# Patient Record
Sex: FEMALE | Race: WHITE | NOT HISPANIC OR LATINO | Employment: OTHER | URBAN - METROPOLITAN AREA
[De-identification: names, ages, dates, MRNs, and addresses within clinical notes are randomized per-mention and may not be internally consistent; named-entity substitution may affect disease eponyms.]

---

## 2017-12-21 ENCOUNTER — ESTABLISHED PATIENT (OUTPATIENT)
Dept: URBAN - METROPOLITAN AREA CLINIC 39 | Facility: CLINIC | Age: 72
End: 2017-12-21

## 2017-12-21 VITALS
HEART RATE: 59 BPM | RESPIRATION RATE: 14 BRPM | SYSTOLIC BLOOD PRESSURE: 150 MMHG | DIASTOLIC BLOOD PRESSURE: 75 MMHG | HEIGHT: 55 IN

## 2017-12-21 DIAGNOSIS — H35.413: ICD-10-CM

## 2017-12-21 DIAGNOSIS — H04.123: ICD-10-CM

## 2017-12-21 DIAGNOSIS — Z98.890: ICD-10-CM

## 2017-12-21 PROCEDURE — 92015 DETERMINE REFRACTIVE STATE: CPT

## 2017-12-21 PROCEDURE — 92134 CPTRZ OPH DX IMG PST SGM RTA: CPT

## 2017-12-21 PROCEDURE — 92014 COMPRE OPH EXAM EST PT 1/>: CPT

## 2017-12-21 ASSESSMENT — VISUAL ACUITY
OD_SC: J2
OS_SC: J2
OD_SC: 20/30
OS_SC: 20/40

## 2017-12-21 ASSESSMENT — TONOMETRY
OS_IOP_MMHG: 20
OD_IOP_MMHG: 20

## 2018-12-20 ENCOUNTER — ESTABLISHED COMPREHENSIVE EXAM (OUTPATIENT)
Dept: URBAN - METROPOLITAN AREA CLINIC 39 | Facility: CLINIC | Age: 73
End: 2018-12-20

## 2018-12-20 DIAGNOSIS — H04.123: ICD-10-CM

## 2018-12-20 DIAGNOSIS — H35.413: ICD-10-CM

## 2018-12-20 DIAGNOSIS — H40.059: ICD-10-CM

## 2018-12-20 PROCEDURE — 92015 DETERMINE REFRACTIVE STATE: CPT

## 2018-12-20 PROCEDURE — 92014 COMPRE OPH EXAM EST PT 1/>: CPT

## 2018-12-20 PROCEDURE — 92134 CPTRZ OPH DX IMG PST SGM RTA: CPT

## 2018-12-20 PROCEDURE — 92133 CPTRZD OPH DX IMG PST SGM ON: CPT

## 2018-12-20 ASSESSMENT — VISUAL ACUITY
OD_SC: 20/40-1
OU_SC: J1
OS_SC: J2
OU_SC: 20/20-1
OS_SC: 20/25-1+2
OD_SC: J1

## 2018-12-20 ASSESSMENT — TONOMETRY
OD_IOP_MMHG: 23
OD_IOP_MMHG: 26
OS_IOP_MMHG: 21
OS_IOP_MMHG: 19

## 2019-12-20 ENCOUNTER — ESTABLISHED COMPREHENSIVE EXAM (OUTPATIENT)
Dept: URBAN - METROPOLITAN AREA CLINIC 40 | Facility: CLINIC | Age: 74
End: 2019-12-20

## 2019-12-20 DIAGNOSIS — H40.053: ICD-10-CM

## 2019-12-20 DIAGNOSIS — H04.123: ICD-10-CM

## 2019-12-20 DIAGNOSIS — H35.413: ICD-10-CM

## 2019-12-20 DIAGNOSIS — Z96.1: ICD-10-CM

## 2019-12-20 PROCEDURE — 92015 DETERMINE REFRACTIVE STATE: CPT

## 2019-12-20 PROCEDURE — 92499OP2 OPTOMAP RETINAL SCREENING BOTH EYES

## 2019-12-20 PROCEDURE — 92014 COMPRE OPH EXAM EST PT 1/>: CPT

## 2019-12-20 ASSESSMENT — VISUAL ACUITY
OD_SC: J6
OS_CC: 20/20
OU_SC: J3 @ 16"
OU_SC: 20/20
OD_SC: 20/20
OS_SC: 20/25
OS_SC: J5
OU_CC: 20/20
OD_CC: 20/20

## 2019-12-20 ASSESSMENT — TONOMETRY
OD_IOP_MMHG: 22
OS_IOP_MMHG: 22

## 2019-12-20 ASSESSMENT — KERATOMETRY
OS_AXISANGLE2_DEGREES: 74
OS_K2POWER_DIOPTERS: 45.5
OS_K1POWER_DIOPTERS: 45.25
OD_K2POWER_DIOPTERS: 45.25
OD_AXISANGLE2_DEGREES: 93
OD_K1POWER_DIOPTERS: 46
OS_AXISANGLE_DEGREES: 164
OD_AXISANGLE_DEGREES: 3

## 2020-03-19 NOTE — PATIENT DISCUSSION
Patient advised of the right to post-operative care by the surgeon. Patient is fully informed of, and agreed to, co-management with their primary optometric physician. Post-operative care by the surgeon is not medically necessary and co-management is clinically appropriate. Patient has received itemization of fees related to cataract surgery. Transfer of care letter completed for the patient. Transfer care of left eye to Dr. Kendall Munoz on 3.20.20. Patient instructed to call immediately if any new distortion, blurring, decreased vision or eye pain.

## 2020-04-28 ENCOUNTER — EST. PATIENT EMERGENCY (OUTPATIENT)
Dept: URBAN - METROPOLITAN AREA CLINIC 39 | Facility: CLINIC | Age: 75
End: 2020-04-28

## 2020-04-28 DIAGNOSIS — H35.413: ICD-10-CM

## 2020-04-28 DIAGNOSIS — H40.053: ICD-10-CM

## 2020-04-28 DIAGNOSIS — H35.352: ICD-10-CM

## 2020-04-28 DIAGNOSIS — H43.813: ICD-10-CM

## 2020-04-28 DIAGNOSIS — H35.372: ICD-10-CM

## 2020-04-28 DIAGNOSIS — H04.123: ICD-10-CM

## 2020-04-28 PROCEDURE — 92015 DETERMINE REFRACTIVE STATE: CPT

## 2020-04-28 PROCEDURE — 92134 CPTRZ OPH DX IMG PST SGM RTA: CPT

## 2020-04-28 PROCEDURE — 92014 COMPRE OPH EXAM EST PT 1/>: CPT

## 2020-04-28 PROCEDURE — 76514 ECHO EXAM OF EYE THICKNESS: CPT

## 2020-04-28 ASSESSMENT — KERATOMETRY
OS_K2POWER_DIOPTERS: 45.5
OD_K2POWER_DIOPTERS: 45.25
OS_K1POWER_DIOPTERS: 45.25
OD_K1POWER_DIOPTERS: 46
OD_AXISANGLE2_DEGREES: 93
OD_AXISANGLE_DEGREES: 3
OS_AXISANGLE2_DEGREES: 74
OS_AXISANGLE_DEGREES: 164

## 2020-04-28 ASSESSMENT — TONOMETRY
OS_IOP_MMHG: 18
OS_IOP_MMHG: 24
OD_IOP_MMHG: 26

## 2020-04-28 ASSESSMENT — VISUAL ACUITY
OD_SC: J2
OD_SC: 20/30-
OS_SC: 20/50-1
OS_SC: J5-

## 2020-05-07 NOTE — PATIENT DISCUSSION
Cataract surgery has been performed in the first eye and activities of daily living are still impaired. The patient would like to proceed with cataract surgery in the second eye as scheduled. The patient elects ADV TMF  OD, goal of HERB.

## 2020-05-11 ENCOUNTER — RETINA CONSULT (OUTPATIENT)
Dept: URBAN - METROPOLITAN AREA CLINIC 39 | Facility: CLINIC | Age: 75
End: 2020-05-11

## 2020-05-11 DIAGNOSIS — H35.372: ICD-10-CM

## 2020-05-11 DIAGNOSIS — H35.363: ICD-10-CM

## 2020-05-11 DIAGNOSIS — H35.352: ICD-10-CM

## 2020-05-11 DIAGNOSIS — H43.813: ICD-10-CM

## 2020-05-11 DIAGNOSIS — H35.413: ICD-10-CM

## 2020-05-11 PROCEDURE — 92014 COMPRE OPH EXAM EST PT 1/>: CPT

## 2020-05-11 PROCEDURE — 92250 FUNDUS PHOTOGRAPHY W/I&R: CPT

## 2020-05-11 PROCEDURE — 67515 INJECT/TREAT EYE SOCKET: CPT

## 2020-05-11 PROCEDURE — 92235 FLUORESCEIN ANGRPH MLTIFRAME: CPT

## 2020-05-11 RX ORDER — PREDNISOLONE ACETATE 10 MG/ML
1 SUSPENSION/ DROPS OPHTHALMIC
Start: 2020-05-11

## 2020-05-11 RX ORDER — KETOROLAC TROMETHAMINE 5 MG/ML
1 SOLUTION OPHTHALMIC
Start: 2020-05-11

## 2020-05-11 ASSESSMENT — TONOMETRY
OS_IOP_MMHG: 16
OD_IOP_MMHG: 22

## 2020-05-11 ASSESSMENT — KERATOMETRY
OD_AXISANGLE_DEGREES: 3
OS_K1POWER_DIOPTERS: 45.25
OS_AXISANGLE2_DEGREES: 74
OS_K2POWER_DIOPTERS: 45.5
OD_K2POWER_DIOPTERS: 45.25
OS_AXISANGLE_DEGREES: 164
OD_AXISANGLE2_DEGREES: 93
OD_K1POWER_DIOPTERS: 46

## 2020-05-11 ASSESSMENT — VISUAL ACUITY
OS_SC: 20/100-1
OD_SC: 20/30-1

## 2020-06-03 ENCOUNTER — ESTABLISHED PATIENT (OUTPATIENT)
Dept: URBAN - METROPOLITAN AREA CLINIC 39 | Facility: CLINIC | Age: 75
End: 2020-06-03

## 2020-06-03 DIAGNOSIS — H43.813: ICD-10-CM

## 2020-06-03 DIAGNOSIS — H35.372: ICD-10-CM

## 2020-06-03 DIAGNOSIS — H35.413: ICD-10-CM

## 2020-06-03 DIAGNOSIS — D31.32: ICD-10-CM

## 2020-06-03 DIAGNOSIS — H35.352: ICD-10-CM

## 2020-06-03 DIAGNOSIS — H35.363: ICD-10-CM

## 2020-06-03 DIAGNOSIS — H35.30: ICD-10-CM

## 2020-06-03 PROCEDURE — 92134 CPTRZ OPH DX IMG PST SGM RTA: CPT

## 2020-06-03 PROCEDURE — 92201 OPSCPY EXTND RTA DRAW UNI/BI: CPT

## 2020-06-03 PROCEDURE — 92014 COMPRE OPH EXAM EST PT 1/>: CPT

## 2020-06-03 ASSESSMENT — VISUAL ACUITY
OD_SC: 20/25
OS_SC: 20/70
OS_PH: 20/50

## 2020-06-03 ASSESSMENT — TONOMETRY
OD_IOP_MMHG: 07
OS_IOP_MMHG: 13

## 2020-06-29 ENCOUNTER — ESTABLISHED PATIENT (OUTPATIENT)
Dept: URBAN - METROPOLITAN AREA CLINIC 39 | Facility: CLINIC | Age: 75
End: 2020-06-29

## 2020-06-29 DIAGNOSIS — H43.813: ICD-10-CM

## 2020-06-29 DIAGNOSIS — H35.30: ICD-10-CM

## 2020-06-29 DIAGNOSIS — H35.372: ICD-10-CM

## 2020-06-29 DIAGNOSIS — H35.363: ICD-10-CM

## 2020-06-29 DIAGNOSIS — D31.32: ICD-10-CM

## 2020-06-29 DIAGNOSIS — H35.352: ICD-10-CM

## 2020-06-29 DIAGNOSIS — H35.413: ICD-10-CM

## 2020-06-29 PROCEDURE — 92201 OPSCPY EXTND RTA DRAW UNI/BI: CPT

## 2020-06-29 PROCEDURE — 92134 CPTRZ OPH DX IMG PST SGM RTA: CPT

## 2020-06-29 PROCEDURE — 92014 COMPRE OPH EXAM EST PT 1/>: CPT

## 2020-06-29 ASSESSMENT — VISUAL ACUITY
OD_SC: 20/40-1
OS_PH: 20/50
OD_PH: 20/30-2
OS_SC: 20/100+1

## 2020-08-10 ENCOUNTER — REFRACTION ONLY (OUTPATIENT)
Dept: URBAN - METROPOLITAN AREA CLINIC 39 | Facility: CLINIC | Age: 75
End: 2020-08-10

## 2020-08-10 DIAGNOSIS — Z97.3: ICD-10-CM

## 2020-08-10 DIAGNOSIS — H52.203: ICD-10-CM

## 2020-08-10 DIAGNOSIS — H52.4: ICD-10-CM

## 2020-08-10 DIAGNOSIS — H04.123: ICD-10-CM

## 2020-08-10 DIAGNOSIS — H40.053: ICD-10-CM

## 2020-08-10 DIAGNOSIS — H52.13: ICD-10-CM

## 2020-08-10 PROCEDURE — 92310-3 LEVEL 3 CONTACT LENS MANAGEMENT

## 2020-08-10 ASSESSMENT — VISUAL ACUITY
OD_SC: 20/30-2
OU_SC: J1
OS_SC: 20/60-2
OS_CC: 20/25 GLASSES
OU_SC: 20/40+2
OD_SC: J2
OS_SC: J2

## 2021-08-10 ENCOUNTER — ESTABLISHED COMPREHENSIVE EXAM (OUTPATIENT)
Dept: URBAN - METROPOLITAN AREA CLINIC 40 | Facility: CLINIC | Age: 76
End: 2021-08-10

## 2021-08-10 DIAGNOSIS — H52.203: ICD-10-CM

## 2021-08-10 DIAGNOSIS — H35.413: ICD-10-CM

## 2021-08-10 DIAGNOSIS — H35.363: ICD-10-CM

## 2021-08-10 DIAGNOSIS — H35.352: ICD-10-CM

## 2021-08-10 DIAGNOSIS — H04.123: ICD-10-CM

## 2021-08-10 DIAGNOSIS — H43.813: ICD-10-CM

## 2021-08-10 DIAGNOSIS — H52.13: ICD-10-CM

## 2021-08-10 DIAGNOSIS — H40.053: ICD-10-CM

## 2021-08-10 DIAGNOSIS — H52.4: ICD-10-CM

## 2021-08-10 DIAGNOSIS — D31.32: ICD-10-CM

## 2021-08-10 DIAGNOSIS — H35.372: ICD-10-CM

## 2021-08-10 PROCEDURE — 92250 FUNDUS PHOTOGRAPHY W/I&R: CPT

## 2021-08-10 PROCEDURE — 92015 DETERMINE REFRACTIVE STATE: CPT

## 2021-08-10 PROCEDURE — 92014 COMPRE OPH EXAM EST PT 1/>: CPT

## 2021-08-10 ASSESSMENT — VISUAL ACUITY
OS_CC: 20/25
OS_SC: J6-
OD_CC: 20/20
OD_SC: 20/30-1
OU_SC: J2-
OS_SC: 20/25
OD_SC: J3
OU_SC: 20/20
OU_CC: 20/20

## 2021-08-10 ASSESSMENT — KERATOMETRY
OS_AXISANGLE_DEGREES: 145
OS_K1POWER_DIOPTERS: 45.50
OD_AXISANGLE_DEGREES: 173
OD_AXISANGLE2_DEGREES: 83
OS_AXISANGLE2_DEGREES: 55
OD_K1POWER_DIOPTERS: 46.25
OD_K2POWER_DIOPTERS: 45.75
OS_K2POWER_DIOPTERS: 46.00

## 2021-08-10 ASSESSMENT — TONOMETRY
OD_IOP_MMHG: 22
OS_IOP_MMHG: 21

## 2021-11-22 NOTE — PATIENT DISCUSSION
Recommend Bilateral upper lid ptosis repair. Medicare. Extra skin removal to be determined. Skin does not affect MRDs. This will not meet Medicare criteria. Excessive skin removal will be cosmetic. (discussed risks and benefits of sx. .. ).

## 2022-02-08 NOTE — PROCEDURE NOTE: SURGICAL
"<span style=""font-weight:bold;"">MR #:</span>&nbsp; [id number]<br /><br /><span style=""font-weight:bold;"">PREOPERATIVE DIAGNOSIS: &nbsp;&nbsp;</span>&nbsp; 1. Ptosis

## 2022-02-15 NOTE — PATIENT DISCUSSION
Upneeq samples given, f/u in two Months for re-evaluation. Discussed possible need for adjusting RUL in future.

## 2022-02-15 NOTE — PATIENT DISCUSSION
Cataract surgery has been performed in the first eye and activities of daily living are still impaired. The patient would like to proceed with cataract surgery in the second eye as scheduled. The patient elects ADV TMF  OD, goal of HREB.

## 2022-04-19 NOTE — PATIENT DISCUSSION
4/19/2022: Well healed, slight OD ptosis compared to OS, but still at good height.  Patient is happy with results at this time.  Discussed RTC if any concerns in the future. Photos taken. Patient declined sample of Upneeq.

## 2022-05-04 NOTE — PROCEDURE NOTE: SURGICAL
<p>Prior to commencing surgery patient identification, surgical procedure, site, and side were confirmed by Dr. Marie Napier. <span>&nbsp; </span>Following topical proparacaine anesthesia, the patient was positioned at the YAG laser, a contact lens coupled to the cornea of the right eye with methylcellulose and an axial posterior capsulotomy performed without complication using 3.1 Mj x 39. Attention was then turned to the left eye and a contact lens coupled to the cornea of the left eye with methylcellulose and an axial posterior capsulotomy performed without complication using 3.1 Mj x 47. One drop of Alphagan was instilled in both eyes and the patient returned to the holding area having tolerated the procedure well and without complication. </p><p>MR [id number]</p><br />

## 2022-08-11 ENCOUNTER — ESTABLISHED PATIENT (OUTPATIENT)
Dept: URBAN - METROPOLITAN AREA CLINIC 40 | Facility: CLINIC | Age: 77
End: 2022-08-11

## 2022-08-11 DIAGNOSIS — H35.372: ICD-10-CM

## 2022-08-11 DIAGNOSIS — H35.363: ICD-10-CM

## 2022-08-11 DIAGNOSIS — H43.813: ICD-10-CM

## 2022-08-11 DIAGNOSIS — H52.4: ICD-10-CM

## 2022-08-11 DIAGNOSIS — H40.053: ICD-10-CM

## 2022-08-11 DIAGNOSIS — H04.123: ICD-10-CM

## 2022-08-11 DIAGNOSIS — H35.62: ICD-10-CM

## 2022-08-11 DIAGNOSIS — D31.32: ICD-10-CM

## 2022-08-11 DIAGNOSIS — H35.413: ICD-10-CM

## 2022-08-11 DIAGNOSIS — H35.30: ICD-10-CM

## 2022-08-11 PROCEDURE — 92250 FUNDUS PHOTOGRAPHY W/I&R: CPT

## 2022-08-11 PROCEDURE — 92014 COMPRE OPH EXAM EST PT 1/>: CPT

## 2022-08-11 PROCEDURE — 92015 DETERMINE REFRACTIVE STATE: CPT

## 2022-08-11 ASSESSMENT — KERATOMETRY
OS_AXISANGLE_DEGREES: 145
OS_K1POWER_DIOPTERS: 45.50
OS_K2POWER_DIOPTERS: 46.00
OD_K2POWER_DIOPTERS: 45.5
OD_AXISANGLE_DEGREES: 176
OS_K2POWER_DIOPTERS: 45.75
OD_K1POWER_DIOPTERS: 46.25
OS_AXISANGLE2_DEGREES: 55
OD_AXISANGLE2_DEGREES: 83
OD_K2POWER_DIOPTERS: 45.75
OS_AXISANGLE_DEGREES: 141
OS_K1POWER_DIOPTERS: 45.5
OD_AXISANGLE2_DEGREES: 86
OS_AXISANGLE2_DEGREES: 51
OD_AXISANGLE_DEGREES: 173

## 2022-08-11 ASSESSMENT — TONOMETRY
OD_IOP_MMHG: 23
OS_IOP_MMHG: 22

## 2022-08-11 ASSESSMENT — VISUAL ACUITY
OS_SC: J4
OD_SC: 20/30-1
OD_SC: J1
OD_CC: 20/20
OS_CC: 20/20-2
OS_SC: 20/30-1

## 2022-10-26 ASSESSMENT — KERATOMETRY
OD_AXISANGLE2_DEGREES: 86
OD_AXISANGLE2_DEGREES: 83
OS_K1POWER_DIOPTERS: 45.5
OS_K2POWER_DIOPTERS: 46.00
OD_AXISANGLE_DEGREES: 173
OS_AXISANGLE_DEGREES: 141
OD_K1POWER_DIOPTERS: 46.25
OS_AXISANGLE_DEGREES: 145
OS_AXISANGLE2_DEGREES: 55
OD_K2POWER_DIOPTERS: 45.75
OS_AXISANGLE2_DEGREES: 51
OD_AXISANGLE_DEGREES: 176
OS_K1POWER_DIOPTERS: 45.50
OD_K2POWER_DIOPTERS: 45.5
OS_K2POWER_DIOPTERS: 45.75

## 2022-10-27 ENCOUNTER — FOLLOW UP (OUTPATIENT)
Dept: URBAN - METROPOLITAN AREA CLINIC 40 | Facility: CLINIC | Age: 77
End: 2022-10-27

## 2022-10-27 DIAGNOSIS — H35.363: ICD-10-CM

## 2022-10-27 DIAGNOSIS — H35.413: ICD-10-CM

## 2022-10-27 DIAGNOSIS — H35.372: ICD-10-CM

## 2022-10-27 DIAGNOSIS — H04.123: ICD-10-CM

## 2022-10-27 DIAGNOSIS — H40.053: ICD-10-CM

## 2022-10-27 DIAGNOSIS — H43.813: ICD-10-CM

## 2022-10-27 DIAGNOSIS — D31.32: ICD-10-CM

## 2022-10-27 PROCEDURE — 92133 CPTRZD OPH DX IMG PST SGM ON: CPT

## 2022-10-27 PROCEDURE — 76514 ECHO EXAM OF EYE THICKNESS: CPT

## 2022-10-27 PROCEDURE — 99213 OFFICE O/P EST LOW 20 MIN: CPT

## 2022-10-27 PROCEDURE — 92134 CPTRZ OPH DX IMG PST SGM RTA: CPT

## 2022-10-27 ASSESSMENT — PACHYMETRY
OS_CT_UM: 615
OD_CT_UM: 609

## 2022-10-27 ASSESSMENT — VISUAL ACUITY: OD_CC: 20/20

## 2022-10-27 ASSESSMENT — TONOMETRY
OD_IOP_MMHG: 21
OS_IOP_MMHG: 21

## 2023-08-23 ASSESSMENT — KERATOMETRY
OS_AXISANGLE2_DEGREES: 55
OD_K1POWER_DIOPTERS: 46.25
OD_K2POWER_DIOPTERS: 45.75
OD_AXISANGLE_DEGREES: 173
OD_AXISANGLE_DEGREES: 176
OD_AXISANGLE2_DEGREES: 83
OS_AXISANGLE_DEGREES: 141
OS_AXISANGLE_DEGREES: 145
OD_K2POWER_DIOPTERS: 45.5
OS_K2POWER_DIOPTERS: 46.00
OS_K1POWER_DIOPTERS: 45.50
OS_AXISANGLE2_DEGREES: 51
OS_K2POWER_DIOPTERS: 45.75
OD_AXISANGLE2_DEGREES: 86
OS_K1POWER_DIOPTERS: 45.5

## 2023-08-24 ENCOUNTER — COMPREHENSIVE EXAM (OUTPATIENT)
Dept: URBAN - METROPOLITAN AREA CLINIC 40 | Facility: CLINIC | Age: 78
End: 2023-08-24

## 2023-08-24 DIAGNOSIS — H52.13: ICD-10-CM

## 2023-08-24 DIAGNOSIS — H40.053: ICD-10-CM

## 2023-08-24 DIAGNOSIS — H52.4: ICD-10-CM

## 2023-08-24 DIAGNOSIS — D31.32: ICD-10-CM

## 2023-08-24 DIAGNOSIS — H35.413: ICD-10-CM

## 2023-08-24 DIAGNOSIS — H35.363: ICD-10-CM

## 2023-08-24 DIAGNOSIS — H04.123: ICD-10-CM

## 2023-08-24 DIAGNOSIS — H35.30: ICD-10-CM

## 2023-08-24 DIAGNOSIS — H35.372: ICD-10-CM

## 2023-08-24 PROCEDURE — 92015 DETERMINE REFRACTIVE STATE: CPT

## 2023-08-24 PROCEDURE — 92014 COMPRE OPH EXAM EST PT 1/>: CPT

## 2023-08-24 PROCEDURE — 92250 FUNDUS PHOTOGRAPHY W/I&R: CPT

## 2023-08-24 ASSESSMENT — VISUAL ACUITY
OS_PH: 20/40-2
OU_SC: J1
OU_SC: 20/25
OS_SC: 20/50
OD_SC: 20/25
OS_SC: J12
OD_SC: J2

## 2023-08-24 ASSESSMENT — TONOMETRY
OD_IOP_MMHG: 28
OS_IOP_MMHG: 26

## 2023-12-19 ASSESSMENT — KERATOMETRY
OD_K1POWER_DIOPTERS: 46.25
OS_AXISANGLE2_DEGREES: 51
OD_AXISANGLE_DEGREES: 176
OS_AXISANGLE_DEGREES: 145
OS_K2POWER_DIOPTERS: 45.75
OS_K2POWER_DIOPTERS: 46.00
OS_K1POWER_DIOPTERS: 45.50
OS_K1POWER_DIOPTERS: 45.5
OS_AXISANGLE2_DEGREES: 55
OD_AXISANGLE_DEGREES: 173
OD_AXISANGLE2_DEGREES: 83
OD_K2POWER_DIOPTERS: 45.75
OD_AXISANGLE2_DEGREES: 86
OS_AXISANGLE_DEGREES: 141
OD_K2POWER_DIOPTERS: 45.5

## 2023-12-20 ENCOUNTER — FOLLOW UP (OUTPATIENT)
Dept: URBAN - METROPOLITAN AREA CLINIC 40 | Facility: CLINIC | Age: 78
End: 2023-12-20

## 2023-12-20 DIAGNOSIS — H35.363: ICD-10-CM

## 2023-12-20 DIAGNOSIS — H35.372: ICD-10-CM

## 2023-12-20 DIAGNOSIS — H40.053: ICD-10-CM

## 2023-12-20 DIAGNOSIS — H35.30: ICD-10-CM

## 2023-12-20 PROCEDURE — 92133 CPTRZD OPH DX IMG PST SGM ON: CPT

## 2023-12-20 PROCEDURE — 99214 OFFICE O/P EST MOD 30 MIN: CPT

## 2023-12-20 PROCEDURE — 92134 CPTRZ OPH DX IMG PST SGM RTA: CPT

## 2023-12-20 ASSESSMENT — TONOMETRY
OS_IOP_MMHG: 23
OS_IOP_MMHG: 19
OD_IOP_MMHG: 24
OD_IOP_MMHG: 23

## 2023-12-20 ASSESSMENT — VISUAL ACUITY
OS_SC: 20/80
OU_SC: 20/30
OD_SC: J2
OD_SC: 20/30
OS_SC: J12
OU_SC: J12

## 2024-04-11 ENCOUNTER — FOLLOW UP (OUTPATIENT)
Dept: URBAN - METROPOLITAN AREA CLINIC 40 | Facility: CLINIC | Age: 79
End: 2024-04-11

## 2024-04-11 DIAGNOSIS — H40.053: ICD-10-CM

## 2024-04-11 PROCEDURE — 92083 EXTENDED VISUAL FIELD XM: CPT

## 2024-04-11 PROCEDURE — 99213 OFFICE O/P EST LOW 20 MIN: CPT

## 2024-04-11 ASSESSMENT — TONOMETRY
OS_IOP_MMHG: 24
OD_IOP_MMHG: 22

## 2024-04-11 ASSESSMENT — VISUAL ACUITY
OS_CC: 20/50
OD_CC: 20/25+2
OU_CC: 20/20
OS_PH: 20/40

## 2025-03-21 ENCOUNTER — COMPREHENSIVE EXAM (OUTPATIENT)
Age: 80
End: 2025-03-21

## 2025-03-21 DIAGNOSIS — H35.413: ICD-10-CM

## 2025-03-21 DIAGNOSIS — H35.363: ICD-10-CM

## 2025-03-21 DIAGNOSIS — D31.32: ICD-10-CM

## 2025-03-21 DIAGNOSIS — H35.30: ICD-10-CM

## 2025-03-21 DIAGNOSIS — H35.372: ICD-10-CM

## 2025-03-21 DIAGNOSIS — Z98.890: ICD-10-CM

## 2025-03-21 DIAGNOSIS — H40.053: ICD-10-CM

## 2025-03-21 PROCEDURE — 92015 DETERMINE REFRACTIVE STATE: CPT

## 2025-03-21 PROCEDURE — 92014 COMPRE OPH EXAM EST PT 1/>: CPT

## 2025-03-21 PROCEDURE — 92250 FUNDUS PHOTOGRAPHY W/I&R: CPT

## 2025-06-26 ENCOUNTER — FOLLOW UP (OUTPATIENT)
Age: 80
End: 2025-06-26

## 2025-06-26 DIAGNOSIS — H35.372: ICD-10-CM

## 2025-06-26 DIAGNOSIS — H40.053: ICD-10-CM

## 2025-06-26 DIAGNOSIS — H35.363: ICD-10-CM

## 2025-06-26 DIAGNOSIS — H35.30: ICD-10-CM

## 2025-06-26 PROCEDURE — 92134 CPTRZ OPH DX IMG PST SGM RTA: CPT

## 2025-06-26 PROCEDURE — 99213 OFFICE O/P EST LOW 20 MIN: CPT
